# Patient Record
Sex: MALE | Race: WHITE | Employment: FULL TIME | URBAN - METROPOLITAN AREA
[De-identification: names, ages, dates, MRNs, and addresses within clinical notes are randomized per-mention and may not be internally consistent; named-entity substitution may affect disease eponyms.]

---

## 2018-09-04 ENCOUNTER — HOSPITAL ENCOUNTER (OUTPATIENT)
Age: 53
Discharge: HOME | End: 2018-09-04
Attending: EMERGENCY MEDICINE
Payer: COMMERCIAL

## 2018-09-04 VITALS
TEMPERATURE: 98 F | OXYGEN SATURATION: 96 % | BODY MASS INDEX: 25.05 KG/M2 | DIASTOLIC BLOOD PRESSURE: 77 MMHG | HEART RATE: 72 BPM | HEIGHT: 70 IN | WEIGHT: 175 LBS | RESPIRATION RATE: 18 BRPM | SYSTOLIC BLOOD PRESSURE: 134 MMHG

## 2018-09-04 DIAGNOSIS — I80.8 SUPERFICIAL THROMBOPHLEBITIS OF LEFT UPPER EXTREMITY: Primary | ICD-10-CM

## 2018-09-04 PROCEDURE — 99202 OFFICE O/P NEW SF 15 MIN: CPT

## 2018-09-04 PROCEDURE — 99203 OFFICE O/P NEW LOW 30 MIN: CPT

## 2018-09-04 RX ORDER — OMEPRAZOLE 20 MG/1
20 CAPSULE, DELAYED RELEASE ORAL EVERY OTHER DAY
COMMUNITY

## 2018-09-04 NOTE — ED PROVIDER NOTES
[facility]    History   Patient presents with:  Skin    Stated Complaint: POSS BUG BITE    HPI    The patient is a 27-year-old male with past history of hypertension, hyperlipidemia presents now with an a 98.2 °F (36.8 °C)  Temp src: Oral  SpO2: 96 %  O2 Device: n/a    Current:/77   Pulse 72   Temp 98.2 °F (36.8 °C) (Oral)   Resp 18   Ht 177.8 cm (5' 10\")   Wt 79.4 kg   SpO2 96%   BMI 25.11 kg/m²         Physical Exam    Constitutional: Yana Juan Js Medication List

## 2018-09-04 NOTE — ED INITIAL ASSESSMENT (HPI)
Bruising to left forearm on Saturday. Didn't think much of it. Was carrying things on Friday. Pea-like center with tenderness. No drainage. No fever. Possible insect bite?

## 2019-12-23 ENCOUNTER — WALK IN (OUTPATIENT)
Dept: URGENT CARE | Age: 54
End: 2019-12-23

## 2019-12-23 DIAGNOSIS — Z23 NEED FOR INFLUENZA VACCINATION: Primary | ICD-10-CM

## 2019-12-23 PROCEDURE — 90471 IMMUNIZATION ADMIN: CPT | Performed by: NURSE PRACTITIONER

## 2019-12-23 PROCEDURE — 90686 IIV4 VACC NO PRSV 0.5 ML IM: CPT | Performed by: NURSE PRACTITIONER

## 2020-11-17 PROBLEM — F10.21 ALCOHOLISM IN REMISSION (HCC): Status: ACTIVE | Noted: 2020-11-17

## 2021-10-26 ENCOUNTER — APPOINTMENT (OUTPATIENT)
Dept: GENERAL RADIOLOGY | Facility: HOSPITAL | Age: 56
DRG: 390 | End: 2021-10-26
Attending: EMERGENCY MEDICINE
Payer: COMMERCIAL

## 2021-10-26 ENCOUNTER — HOSPITAL ENCOUNTER (INPATIENT)
Facility: HOSPITAL | Age: 56
LOS: 3 days | Discharge: HOME | DRG: 390 | End: 2021-10-29
Attending: EMERGENCY MEDICINE | Admitting: HOSPITALIST
Payer: COMMERCIAL

## 2021-10-26 ENCOUNTER — APPOINTMENT (OUTPATIENT)
Dept: CT IMAGING | Facility: HOSPITAL | Age: 56
DRG: 390 | End: 2021-10-26
Attending: EMERGENCY MEDICINE
Payer: COMMERCIAL

## 2021-10-26 DIAGNOSIS — K56.609 SMALL BOWEL OBSTRUCTION (HCC): Primary | ICD-10-CM

## 2021-10-26 PROCEDURE — 80048 BASIC METABOLIC PNL TOTAL CA: CPT | Performed by: EMERGENCY MEDICINE

## 2021-10-26 PROCEDURE — 80076 HEPATIC FUNCTION PANEL: CPT | Performed by: EMERGENCY MEDICINE

## 2021-10-26 PROCEDURE — 87086 URINE CULTURE/COLONY COUNT: CPT | Performed by: EMERGENCY MEDICINE

## 2021-10-26 PROCEDURE — 96375 TX/PRO/DX INJ NEW DRUG ADDON: CPT

## 2021-10-26 PROCEDURE — 71045 X-RAY EXAM CHEST 1 VIEW: CPT | Performed by: EMERGENCY MEDICINE

## 2021-10-26 PROCEDURE — 96374 THER/PROPH/DIAG INJ IV PUSH: CPT

## 2021-10-26 PROCEDURE — 74177 CT ABD & PELVIS W/CONTRAST: CPT | Performed by: EMERGENCY MEDICINE

## 2021-10-26 PROCEDURE — 81001 URINALYSIS AUTO W/SCOPE: CPT | Performed by: EMERGENCY MEDICINE

## 2021-10-26 PROCEDURE — 99285 EMERGENCY DEPT VISIT HI MDM: CPT

## 2021-10-26 PROCEDURE — 85025 COMPLETE CBC W/AUTO DIFF WBC: CPT | Performed by: EMERGENCY MEDICINE

## 2021-10-26 PROCEDURE — 83735 ASSAY OF MAGNESIUM: CPT | Performed by: EMERGENCY MEDICINE

## 2021-10-26 RX ORDER — PROCHLORPERAZINE EDISYLATE 5 MG/ML
5 INJECTION INTRAMUSCULAR; INTRAVENOUS EVERY 8 HOURS PRN
Status: DISCONTINUED | OUTPATIENT
Start: 2021-10-26 | End: 2021-10-29

## 2021-10-26 RX ORDER — MORPHINE SULFATE 2 MG/ML
2 INJECTION, SOLUTION INTRAMUSCULAR; INTRAVENOUS EVERY 2 HOUR PRN
Status: DISCONTINUED | OUTPATIENT
Start: 2021-10-26 | End: 2021-10-29

## 2021-10-26 RX ORDER — SODIUM CHLORIDE 9 MG/ML
1000 INJECTION, SOLUTION INTRAVENOUS ONCE
Status: DISCONTINUED | OUTPATIENT
Start: 2021-10-26 | End: 2021-10-29

## 2021-10-26 RX ORDER — MORPHINE SULFATE 2 MG/ML
1 INJECTION, SOLUTION INTRAMUSCULAR; INTRAVENOUS EVERY 2 HOUR PRN
Status: DISCONTINUED | OUTPATIENT
Start: 2021-10-26 | End: 2021-10-29

## 2021-10-26 RX ORDER — DOCUSATE SODIUM 100 MG/1
100 CAPSULE, LIQUID FILLED ORAL 2 TIMES DAILY
COMMUNITY
End: 2021-12-20 | Stop reason: ALTCHOICE

## 2021-10-26 RX ORDER — ONDANSETRON 2 MG/ML
4 INJECTION INTRAMUSCULAR; INTRAVENOUS EVERY 6 HOURS PRN
Status: DISCONTINUED | OUTPATIENT
Start: 2021-10-26 | End: 2021-10-29

## 2021-10-26 RX ORDER — MIDAZOLAM HYDROCHLORIDE 10 MG/2ML
2 INJECTION, SOLUTION INTRAMUSCULAR; INTRAVENOUS ONCE
Status: COMPLETED | OUTPATIENT
Start: 2021-10-26 | End: 2021-10-26

## 2021-10-26 RX ORDER — ONDANSETRON 4 MG/1
4 TABLET, ORALLY DISINTEGRATING ORAL EVERY 8 HOURS PRN
COMMUNITY
End: 2021-12-20 | Stop reason: ALTCHOICE

## 2021-10-26 RX ORDER — CYCLOBENZAPRINE HCL 5 MG
5 TABLET ORAL 3 TIMES DAILY PRN
COMMUNITY
End: 2021-12-20 | Stop reason: ALTCHOICE

## 2021-10-26 RX ORDER — BISACODYL 10 MG
10 SUPPOSITORY, RECTAL RECTAL
Status: DISCONTINUED | OUTPATIENT
Start: 2021-10-26 | End: 2021-10-29

## 2021-10-26 RX ORDER — OXYCODONE HYDROCHLORIDE 5 MG/1
5 TABLET ORAL EVERY 4 HOURS PRN
COMMUNITY
End: 2021-12-20 | Stop reason: ALTCHOICE

## 2021-10-26 RX ORDER — SODIUM PHOSPHATE, DIBASIC AND SODIUM PHOSPHATE, MONOBASIC 7; 19 G/133ML; G/133ML
1 ENEMA RECTAL ONCE AS NEEDED
Status: DISCONTINUED | OUTPATIENT
Start: 2021-10-26 | End: 2021-10-29

## 2021-10-26 RX ORDER — MORPHINE SULFATE 4 MG/ML
4 INJECTION, SOLUTION INTRAMUSCULAR; INTRAVENOUS EVERY 2 HOUR PRN
Status: DISCONTINUED | OUTPATIENT
Start: 2021-10-26 | End: 2021-10-29

## 2021-10-26 RX ORDER — MORPHINE SULFATE 4 MG/ML
4 INJECTION, SOLUTION INTRAMUSCULAR; INTRAVENOUS ONCE
Status: COMPLETED | OUTPATIENT
Start: 2021-10-26 | End: 2021-10-26

## 2021-10-26 RX ORDER — ONDANSETRON 2 MG/ML
4 INJECTION INTRAMUSCULAR; INTRAVENOUS ONCE
Status: COMPLETED | OUTPATIENT
Start: 2021-10-26 | End: 2021-10-26

## 2021-10-26 RX ORDER — MORPHINE SULFATE 15 MG/1
15 TABLET ORAL EVERY 4 HOURS PRN
COMMUNITY
End: 2021-12-20 | Stop reason: ALTCHOICE

## 2021-10-26 RX ORDER — SODIUM CHLORIDE 9 MG/ML
INJECTION, SOLUTION INTRAVENOUS CONTINUOUS
Status: DISCONTINUED | OUTPATIENT
Start: 2021-10-26 | End: 2021-10-29

## 2021-10-26 NOTE — ED INITIAL ASSESSMENT (HPI)
Pt reports having lumbar fusion surgery last Thursday now with abdominal pain/ distension, constipation and nausea

## 2021-10-26 NOTE — ED PROVIDER NOTES
Patient Seen in: [facility] AND [facility] Emergency Department      History   Patient presents with:  Postop/Procedure Problem    Stated Complaint: post op complication, lumbar fusion, abdominal pain , unable to eat    Subjective:   HPI    50-year-old male pres kg   SpO2 95%   BMI 26.54 kg/m²         Physical Exam  Vitals and nursing note reviewed. Constitutional:       Appearance: He is well-developed. HENT:      Head: Normocephalic and atraumatic.    Eyes:      General: Lids are normal.      Extraocular Move Albumin 3.1 (*)     All other components within normal limits   CBC W/ DIFFERENTIAL - Abnormal; Notable for the following components:    Lymphocyte Absolute 0.67 (*)     Monocyte Absolute 1.21 (*)     All other components within normal limits   MAGNESIUM - stomach. Moderate air-filled distention of proximal visualized small bowel. Correlate clinically for distal bowel obstruction.     Dictated by (CST): Don Block MD on 10/26/2021 at 7:13 PM     Finalized by (CST): Don Block MD on 10/26/2021 at 7:1 distention of proximal and mid small bowel with more decompressed distal small bowel. Findings suggest a more distal small bowel obstruction. Appendix is mildly fluid-filled but probably normal in caliber.   Of note, there is moderate fluid-filled distent postoperative changes from previous hernia repair. 4.   Recent postoperative changes at L4-L5 from decompression laminectomy and posterior fusion.      Dictated by (CST): Raul Means MD on 10/26/2021 at 8:51 PM     Finalized by (CST): Raul Means MD

## 2021-10-27 PROCEDURE — C9113 INJ PANTOPRAZOLE SODIUM, VIA: HCPCS | Performed by: INTERNAL MEDICINE

## 2021-10-27 PROCEDURE — 85025 COMPLETE CBC W/AUTO DIFF WBC: CPT | Performed by: HOSPITALIST

## 2021-10-27 PROCEDURE — 83735 ASSAY OF MAGNESIUM: CPT | Performed by: HOSPITALIST

## 2021-10-27 PROCEDURE — 87493 C DIFF AMPLIFIED PROBE: CPT | Performed by: HOSPITALIST

## 2021-10-27 PROCEDURE — 80048 BASIC METABOLIC PNL TOTAL CA: CPT | Performed by: HOSPITALIST

## 2021-10-27 RX ORDER — POTASSIUM CHLORIDE 14.9 MG/ML
20 INJECTION INTRAVENOUS ONCE
Status: COMPLETED | OUTPATIENT
Start: 2021-10-27 | End: 2021-10-27

## 2021-10-27 NOTE — PROGRESS NOTES
No C/O of CP SOB NV. Pain controlled. Minimal low back. Complete resolution of right LE symptoms. Bowel movement x2. Distention improved.    AVSS  Dressing is CDI  Motor to bilateral LE at baseline 5/5 throughout  Nv intact  Homans neg    S/p L4-5 TLIF 10/2

## 2021-10-27 NOTE — H&P
[facility] Hospitalist H&P       CC: Patient presents with:  Postop/Procedure Problem       PCP: Marie Dominguez MD    History of Present Illness: Patient is a 48year old male with PMH sig for HTN, HL, GERD/ PUD, Anxiety/Depression, Spinal Stenosis who release  tabs  take one tablet by mouth every 4hrs or 2 tablets by mouth every 6 hrs as needed for pain, Disp: , Rfl:   docusate sodium 100 MG Oral Cap, Take 100 mg by mouth 2 (two) times daily. , Disp: , Rfl:   SERTRALINE 50 MG Oral Tab, TAKE 1 TABLET(50 M CBC/Chem  Recent Labs   Lab 10/26/21  1815 10/27/21  0535   WBC 7.3 5.4   HGB 13.4 10.8*   MCV 90.5 91.2   .0 303.0       Recent Labs   Lab 10/26/21  1815 10/27/21  0535   * 136   K 3.5 3.1*   CL 92* 99   CO2 31.0 31.0   BUN 11 11   CREATSER postoperative changes from previous hernia repair. 4.   Recent postoperative changes at L4-L5 from decompression laminectomy and posterior fusion.      Dictated by (CST): Jaleesa Devi MD on 10/26/2021 at 8:51 PM     Finalized by (CST): Jaleesa Devi MD

## 2021-10-27 NOTE — PAYOR COMM NOTE
--------------  ADMISSION REVIEW     Payor: Miguelito Lyme [employer] PPO  Subscriber #:  [id number]  Authorization Number: [id number]    Admit date: 10/26/21  Admit time: 10:17 PM       REVIEW DOCUMENTATION:     ED Provider Notes      ED Provider Notes signed b for stated complaint: post op complication, lumbar fusion, abdominal pain , unable to eat  Other systems are as noted in HPI. Constitutional and vital signs reviewed. All other systems reviewed and negative except as noted above.     Physical Exam Ketones Urine 20  (*)     Blood Urine Moderate (*)     Protein Urine 30  (*)     Leukocyte Esterase Urine Trace (*)     WBC Urine 11-20 (*)     RBC Urine 3-5 (*)     All other components within normal limits   BASIC METABOLIC PANEL (8) - Abnormal; Notab COMPARISON: None. INDICATIONS: Verify correct placement of NGT. TECHNIQUE:   Single view. FINDINGS:  CARDIAC/VASC: Normal.  No cardiac silhouette abnormality or cardiomegaly. Unremarkable pulmonary vasculature.   MEDIAST/NICOLA:   No visible mass or felipe focal lesion. Small amount of perisplenic fluid noted. STOMACH: No gross gastric mass, obstruction or focal abnormality. Duodenum unremarkable. NG tube noted in the stomach. PANCREAS: No lesion, fluid collection, ductal dilatation, or atrophy.   Ivan Pod postoperative ileus since the patient is postop 5 days ago. Follow-up studies are advised. 2. Diffuse fatty infiltration of the liver with a prominent right lobe.   1.1 cm poorly defined low-attenuation lesion in the right lobe of the liver is indeterminat ICD-10-CM Noted POA    * (Principal) Small bowel obstruction [facility]) K56.609 10/26/2021 Unknown                         Signed by Rui Way MD on 10/26/2021  9:32 PM         H&P Note    No notes of this type exist for this encounter. 140/82 92 % — — — [place]    10/26/21 1830 — 77 — 130/75 96 % — — — [place]    10/26/21 1815 — 79 16 140/86 94 % — — — [place]    10/26/21 1616 98.7 °F (37.1 °C) 84 18 142/85 99 % 185 lb None (Room air) — AL        NAISMWA Scores (since admission)     None          PLEASE F

## 2021-10-27 NOTE — ED QUICK NOTES
Orders for admission, patient is aware of plan and ready to go upstairs. Any questions, please call ED STEPHANIE Zavala  at extension [phone]. Type of COVID test sent: Rapid negative  COVID Suspicion level: Low    Titratable drug(s) infusing:  NaCl   Rate: 125    L

## 2021-10-27 NOTE — PAYOR COMM NOTE
--------------  CONTINUED STAY REVIEW    Payor: Yen Tavarez [employer] PPO  Subscriber #:  [id number]  Authorization Number: [id number]    Admit date: 10/26/21  Admit time: 10:17 PM    Admitting Physician: Abimael Talbert MD  Attending Physician:  Ernestine Morin 121/68 93 % — — — [place]    10/26/21 1900 — 90 — 131/78 95 % — — — [place]    10/26/21 1845 — 96 16 140/82 92 % — — — [place]    10/26/21 1830 — 77 — 130/75 96 % — — — [place]    10/26/21 1815 — 79 16 140/86 94 % — — — [place]    10/26/21 1616 98.7 °F (37.1 °C) 84 18 142/85 99 %

## 2021-10-27 NOTE — PLAN OF CARE
Patient alert and oriented x4. Up independently. Walking in halls through out shift. Brace in place to back. NPO. NG tube in place to LIS. Clamping trial started this afternoon. Voiding freely. (+) for BM this AM. Pain controlled with PRN morphine.  Call l effectiveness of ordered antiemetic medications  - Provide nonpharmacologic comfort measures as appropriate  - Advance diet as tolerated, if ordered  - Obtain nutritional consult as needed  - Evaluate fluid balance  Outcome: Not Progressing  Goal: Maintain

## 2021-10-27 NOTE — PLAN OF CARE
Pt came to ED for abdominal pain, difficulty urinating, constipation, and N/V. CT of the abdomen showed small bowel obstruction. Pt is on NG to LIS in the right nare. Pt is strict NPO. Pt had a lumbar infusion on 10/21/2021. No complaints of N/V overnight. nausea and vomiting  Description: INTERVENTIONS:  - Maintain adequate hydration with IV or PO as ordered and tolerated  - Nasogastric tube to low intermittent suction as ordered  - Evaluate effectiveness of ordered antiemetic medications  - Provide nonphar pain  - Anticipate increased pain with activity and pre-medicate as appropriate  Outcome: Progressing

## 2021-10-27 NOTE — CM/SW NOTE
SW received phone call from Gianluca Berry to notify SW that patient is current with RN and PT. SW to send updated orders and notes. PLAN: Home with [facility]-orders/f2f placed.      ELVIRA Zarco, [facility]    [id number]

## 2021-10-27 NOTE — CONSULTS
SURGICAL CONSULT      CC: Patient presents with:  Postop/Procedure Problem       PCP: Vinay Camara MD    History of Present Illness: Patient is a 48year old male with PMH sig for HTN, HL, GERD/ PUD, Anxiety/Depression, Spinal Stenosis who prese Nausea (q 6 hrs). , Disp: , Rfl:   morphINE 15 MG Oral Tab, Take 15 mg by mouth every 4 (four) hours as needed for Pain.  Morphine sulfate 15 mg ER by mouth twice a day  for 5 days then daily, Disp: , Rfl:   oxyCODONE 5 MG Oral Tab, Take 5 mg by mouth every DATA:      CBC/Chem  Recent Labs   Lab 10/26/21  1815 10/27/21  0535   WBC 7.3 5.4   HGB 13.4 10.8*   MCV 90.5 91.2   .0 303.0       Recent Labs   Lab 10/26/21  1815 10/27/21  0535   * 136   K 3.5 3.1*   CL 92* 99   CO2 31.0 31.0   BUN 11 11 information is transmitted to the ACR ([facility]) NRDR ([address]) which includes the Dose Index Registry. FINDINGS:  LIVER: Mild diffuse fatty infiltration of the liver.   Enlarged right lobe measuring 20 cm in l changes in the paraspinous musculature. LUNG BASES: Mild linear bibasilar atelectasis. OTHER: Negative. CONCLUSION:  1. There is moderate fluid in the air-filled distention of proximal and mid small bowel with more decompressed distal small bowel. he was going without as he couldn't keep it down. He has a small BM on Sunday and has had a few BMs since admission. He has a hernia repair in 2014 (laparoscopic ventral hernia repair with Dr. Kimberly Mijares). Denies CP, SOB, f/c.  Does have a headache but states

## 2021-10-28 ENCOUNTER — APPOINTMENT (OUTPATIENT)
Dept: ULTRASOUND IMAGING | Facility: HOSPITAL | Age: 56
DRG: 390 | End: 2021-10-28
Attending: INTERNAL MEDICINE
Payer: COMMERCIAL

## 2021-10-28 PROCEDURE — 82962 GLUCOSE BLOOD TEST: CPT

## 2021-10-28 PROCEDURE — 76705 ECHO EXAM OF ABDOMEN: CPT | Performed by: INTERNAL MEDICINE

## 2021-10-28 PROCEDURE — 85025 COMPLETE CBC W/AUTO DIFF WBC: CPT | Performed by: INTERNAL MEDICINE

## 2021-10-28 PROCEDURE — 83735 ASSAY OF MAGNESIUM: CPT | Performed by: INTERNAL MEDICINE

## 2021-10-28 PROCEDURE — C9113 INJ PANTOPRAZOLE SODIUM, VIA: HCPCS | Performed by: INTERNAL MEDICINE

## 2021-10-28 PROCEDURE — 80048 BASIC METABOLIC PNL TOTAL CA: CPT | Performed by: INTERNAL MEDICINE

## 2021-10-28 RX ORDER — POTASSIUM CHLORIDE 14.9 MG/ML
20 INJECTION INTRAVENOUS ONCE
Status: COMPLETED | OUTPATIENT
Start: 2021-10-28 | End: 2021-10-28

## 2021-10-28 NOTE — PROGRESS NOTES
No C/O of CP SOB NV. Pain controlled with morphine push every 8 hours. Very hungry with no nausea.  Passing gas and BM.   AVSS  incision is CDI mild edema inferior to the incision  Motor to bilateral LE at baseline 5/5 throughout  Nv intact  Homans neg    S

## 2021-10-28 NOTE — PROGRESS NOTES
[facility] Hospitalist Progress Note     CC: Hospital Follow up    PCP: Ricardo Cruz MD       Assessment/Plan:     Principal Problem:    Small bowel obstruction [facility])  Patient is a 48year old male with PMH sig for HTN, HL, GERD/ PUD, Anxiety/Depressio (Last 24 hours) at 10/28/2021 0947  Last data filed at 10/28/2021 2502  Gross per 24 hour   Intake 2329 ml   Output 355 ml   Net 1974 ml       Last 3 Weights  10/26/21 1616 : 185 lb (83.9 kg)  09/02/21 1251 : 185 lb (83.9 kg)  07/02/21 1130 : 196 lb 3.2 oz distention of proximal and mid small bowel with more decompressed distal small bowel. Findings are suggestive of a more distal small bowel obstruction. This is less likely ileus given the fact that the more distal small bowel is not involved.   Recommend

## 2021-10-28 NOTE — CM/SW NOTE
SW met with patient and his partner at bedside, introduced self and role. Patient lives with his partner in home (not address on face sheet). Lives in [place]. Patient is current with Gianluca Berry.  Patient expressed frustration about hospital

## 2021-10-28 NOTE — PAYOR COMM NOTE
--------------  10/27- 28 CONTINUED STAY REVIEW    Payor: Miranda Friend [employer] PPO  Subscriber #:  [id number]  Authorization Number: [id number]    10/27:    SURGERY:  History of Present Illness: Patient is [age] year old male with PMH sig for HTN, HL, GERD/ ileus  Afebrile  WBC WNL     PLAN:     SBO vs Ileus  NGT to LIS - will do clamping trial, d/w RN  Continue NPO, if NG tube is removed will reassess in Am for diet  Continue IVF  Ambulate  Suspect possible Ileus with hx of recent surgery     10/28:    Massiel Miner (36.9 °C), temperature source Oral, resp.  rate 18, height 5' 10\" (1.778 m), weight 185 lb (83.9 kg), SpO2 95 %.     Temp:  [98.4 °F (36.9 °C)-98.9 °F (37.2 °C)] 98.4 °F (36.9 °C)  Pulse:  [71-86] 71  Resp:  [18] 18  BP: (118-128)/(61-77) 128/77       Rece Jami Gamez RN          Vitals (last day)     Date/Time Temp Pulse Resp BP SpO2 Weight O2 Device O2 Flow Rate (L/min) Who    10/28/21 0646 98.4 °F (36.9 °C) 71 18 128/77 95 % — None (Room air) — EN    10/27/21 2131 98.7 °F (37.1 °C) 75 18 118/72 98

## 2021-10-28 NOTE — PROGRESS NOTES
[facility]    Progress Note    Yuri Vargas Patient Status:  Inpatient    1965 MRN [id number]   Location [facility] 4W/SW/SE Attending Celeste Rodriguez MD   Hosp Day # 2 PCP Cabrera Chiang MD        Subjective:   D 122/67, pulse 67, temperature 98.8 °F (37.1 °C), temperature source Oral, resp. rate 18, height 5' 10\" (1.778 m), weight 185 lb (83.9 kg), SpO2 96 %. I/O last 3 completed shifts:   In: 3155.2 [I.V.:3055.2; IV PIGGYBACK:100]  Out: 948 [Urine:640; Emesis body of the stomach. Moderate air-filled distention of proximal visualized small bowel. Correlate clinically for distal bowel obstruction.     Dictated by (CST): Sanjeev Jewell MD on 10/26/2021 at 7:13 PM     Finalized by (CST): Sanjeev Jewell MD on 10/26

## 2021-10-28 NOTE — PLAN OF CARE
Patient alert and oriented x4. Up independently. Walking in halls through out shift. Brace in place to back. Started on clear liquid diet, tolerating well. Voiding freely. (+) for BM. Pain controlled with PRN morphine. Call light in reach.  Frequent roundi antiemetic medications  - Provide nonpharmacologic comfort measures as appropriate  - Advance diet as tolerated, if ordered  - Obtain nutritional consult as needed  - Evaluate fluid balance  Outcome: Not Progressing  Goal: Maintains or returns to baseline

## 2021-10-28 NOTE — PLAN OF CARE
NG removed per orders after residual checked. No nausea. BM x 1 overnight. Up walking hallways independently. Back brace in place at all times. Morphine prn for pain. Voiding WNL. Safety plan in place. Plan to advance diet per surgery.  Home with girlfriend tolerated  - Evaluate effectiveness of GI medications  - Encourage mobilization and activity  - Obtain nutritional consult as needed  - Establish a toileting routine/schedule  - Consider collaborating with pharmacy to review patient's medication profile  O

## 2021-10-29 VITALS
RESPIRATION RATE: 18 BRPM | BODY MASS INDEX: 26.48 KG/M2 | DIASTOLIC BLOOD PRESSURE: 65 MMHG | HEIGHT: 70 IN | SYSTOLIC BLOOD PRESSURE: 109 MMHG | TEMPERATURE: 99 F | HEART RATE: 62 BPM | OXYGEN SATURATION: 96 % | WEIGHT: 185 LBS

## 2021-10-29 PROCEDURE — 80048 BASIC METABOLIC PNL TOTAL CA: CPT | Performed by: INTERNAL MEDICINE

## 2021-10-29 PROCEDURE — C9113 INJ PANTOPRAZOLE SODIUM, VIA: HCPCS | Performed by: INTERNAL MEDICINE

## 2021-10-29 PROCEDURE — 85025 COMPLETE CBC W/AUTO DIFF WBC: CPT | Performed by: INTERNAL MEDICINE

## 2021-10-29 PROCEDURE — 83735 ASSAY OF MAGNESIUM: CPT | Performed by: INTERNAL MEDICINE

## 2021-10-29 NOTE — PROGRESS NOTES
No C/O of CP SOB NV. Pain controlled with morphine.  Passing gas and had multiple BM yesterday   AVSS  Motor to bilateral LE at baseline 5/5 throughout  Nv intact  Homans neg    S/p L4-5 TLIF    Mobilize  PT/OT  NO lifting over 15 pounds or ROM of the lumba

## 2021-10-29 NOTE — CM/SW NOTE
DC Order present    SW notified [address], orders/f2f placed.      ELVIRA Yates, [facility]    [id number]

## 2021-10-29 NOTE — DISCHARGE SUMMARY
General Medicine Discharge Summary     Patient ID:  Meliza Morley  48year old  11/1/1965    Admit date: 10/26/2021    Discharge date and time: No discharge date for patient encounter.  10/29/21    Attending Physician: Og Bautista MD     Consults: MIA in 3 months, f/u LFTs as outpatient      GERD/ PUD  - PPI     Chronic Problems  HTN, HL, Anxiety/Depression  - resume home meds      Spinal Stenosis  S/p L4-5 TLIF 10/21/21  - pain control  - ortho on consult, f/u in 2 weeks     Operative Procedures: 10/26/2021 at 8:51 PM     Finalized by (CST): Hannah Ervin MD on 10/26/2021 at 9:03 PM          Disposition: home    Activity: as tolerated   Diet: general   Wound Care: no needs  Code Status: Full Code  O2: no needs    Home Medication Changes: as below

## 2021-10-29 NOTE — PLAN OF CARE
Problem: Patient Centered Care  Goal: Patient preferences are identified and integrated in the patient's plan of care  Description: Interventions:  - What would you like us to know as we care for you?  Home with partner  - Provide timely, complete, and ac INTERVENTIONS:  - Assess bowel function  - Maintain adequate hydration with IV or PO as ordered and tolerated  - Evaluate effectiveness of GI medications  - Encourage mobilization and activity  - Obtain nutritional consult as needed  - Establish a toiletin

## 2021-10-29 NOTE — PAYOR COMM NOTE
--------------  10/29 CONTINUED STAY REVIEW    Payor: [employer] PPO  Subscriber #:  [id number]  Authorization Number: [id number]          Dc home

## 2021-10-29 NOTE — PLAN OF CARE
Pt is tolerating soft, low fiber diet. Pt is passing gas and had a BM overnight. Pt ambulating in room and in hallway independently. Pt is okay to discharge. PIV was removed. Discharge papers printed and education provided to pt. Follow-up appts discussed. effectiveness of ordered antiemetic medications  - Provide nonpharmacologic comfort measures as appropriate  - Advance diet as tolerated, if ordered  - Obtain nutritional consult as needed  - Evaluate fluid balance  Outcome: Completed  Goal: Maintains or r

## 2021-11-01 NOTE — PAYOR COMM NOTE
--------------  DISCHARGE REVIEW    Payor: Anna Browning [employer] PPO  Subscriber #:  [id number]  Authorization Number: [id number]    Admit date: 10/26/21  Admit time:  10:17 PM  Discharge Date: 10/29/2021  2:41 PM     Admitting Physician: Arabella Baker MD Has an appetite at the moment. Girlfriend at bedside.      Hospital Course:   Patient is a 51 year old male with PMH sig for HTN, HL, GERD/ PUD, Anxiety/Depression, Spinal Stenosis who presents with abdominal pain s/p recent spinal surgery 5 days ago.     days ago. Follow-up studies are advised. 2. Diffuse fatty infiltration of the liver with a prominent right lobe.   1.1 cm poorly defined low-attenuation lesion in the right lobe of the liver is indeterminate and is not typical for a cyst.  Recommend ultras [address] [facility] instructions:       Other Discharge Instructions:       Liver Lesion  - CT with 1.1 cm poorly defined low-attenuation lesion in the right lobe of the liver is indeterminate and is not typical

## 2022-02-05 ENCOUNTER — LAB ENCOUNTER (OUTPATIENT)
Dept: LAB | Facility: HOSPITAL | Age: 57
End: 2022-02-05
Attending: INTERNAL MEDICINE
Payer: COMMERCIAL

## 2022-02-05 DIAGNOSIS — Z12.11 COLON CANCER SCREENING: ICD-10-CM

## 2022-02-06 LAB — SARS-COV-2 RNA RESP QL NAA+PROBE: NOT DETECTED

## 2022-02-08 ENCOUNTER — ANESTHESIA EVENT (OUTPATIENT)
Dept: ENDOSCOPY | Facility: HOSPITAL | Age: 57
End: 2022-02-08
Payer: COMMERCIAL

## 2022-02-08 ENCOUNTER — HOSPITAL ENCOUNTER (OUTPATIENT)
Facility: HOSPITAL | Age: 57
Setting detail: HOSPITAL OUTPATIENT SURGERY
Discharge: HOME | End: 2022-02-08
Attending: INTERNAL MEDICINE | Admitting: INTERNAL MEDICINE
Payer: COMMERCIAL

## 2022-02-08 ENCOUNTER — ANESTHESIA (OUTPATIENT)
Dept: ENDOSCOPY | Facility: HOSPITAL | Age: 57
End: 2022-02-08
Payer: COMMERCIAL

## 2022-02-08 VITALS
WEIGHT: 190 LBS | BODY MASS INDEX: 28.79 KG/M2 | SYSTOLIC BLOOD PRESSURE: 108 MMHG | OXYGEN SATURATION: 96 % | RESPIRATION RATE: 16 BRPM | HEART RATE: 79 BPM | DIASTOLIC BLOOD PRESSURE: 75 MMHG | HEIGHT: 68 IN

## 2022-02-08 DIAGNOSIS — Z12.11 COLON CANCER SCREENING: Primary | ICD-10-CM

## 2022-02-08 DIAGNOSIS — Z12.11 SPECIAL SCREENING FOR MALIGNANT NEOPLASMS, COLON: ICD-10-CM

## 2022-02-08 PROCEDURE — 0DJD8ZZ INSPECTION OF LOWER INTESTINAL TRACT, VIA NATURAL OR ARTIFICIAL OPENING ENDOSCOPIC: ICD-10-PCS | Performed by: INTERNAL MEDICINE

## 2022-02-08 RX ORDER — NALOXONE HYDROCHLORIDE 0.4 MG/ML
80 INJECTION, SOLUTION INTRAMUSCULAR; INTRAVENOUS; SUBCUTANEOUS AS NEEDED
Status: DISCONTINUED | OUTPATIENT
Start: 2022-02-08 | End: 2022-02-08

## 2022-02-08 RX ORDER — SODIUM CHLORIDE, SODIUM LACTATE, POTASSIUM CHLORIDE, CALCIUM CHLORIDE 600; 310; 30; 20 MG/100ML; MG/100ML; MG/100ML; MG/100ML
INJECTION, SOLUTION INTRAVENOUS CONTINUOUS
Status: DISCONTINUED | OUTPATIENT
Start: 2022-02-08 | End: 2022-02-08

## 2022-02-08 RX ADMIN — SODIUM CHLORIDE, SODIUM LACTATE, POTASSIUM CHLORIDE, CALCIUM CHLORIDE: 600; 310; 30; 20 INJECTION, SOLUTION INTRAVENOUS at 14:31:00

## 2022-02-08 NOTE — OPERATIVE REPORT
[facility] Patient Status:  Hospital Outpatient Surgery    1965 MRN [id number]   Location [address] [address] Attending Mary Ann Guadarrama MD   Hosp Day # 0 PCP Attila Olson MD         PATIENT NAME: Emilie Neil OF OPERATION: 2022    PREOPERATIVE DIAGNOSIS:  1. CRC screening  POSTOPERATIVE DIAGNOSIS:  1. Diverticulosis    PROCEDURE PERFORMED: Colonoscopy with MAC sedation  SURGEON: Elis Lindsay MD   MEDICATIONS: MAC IV in divided doses under the supervision of Anesthesia  PROCEDURE AND FINDINGS: The patient was placed into the left lateral decubitus position after informed consent was obtained. All questions were answered. MAC IV sedation was administered. A rectal exam was performed which was normal.  The Olympus video colonoscope was then introduced through the rectum and advanced through the colon to the cecum. The quality of prep was excellent, adequate, Aronchick 1. The cecum was identified by the ileocecal valve and appendiceal orifice. The colonoscope was then withdrawn and the mucosa was further carefully inspected. There were diverticula noted in the entire colon. The remainder of the entire examined colon was otherwise normal.  After retroflexion in the rectum, the colonoscope was straightened and removed and the procedure was completed. The patient tolerated the procedure well. There were no implants placed nor significant blood loss. There were no immediate apparent complications. Anesthesia was present to assist in monitoring the patient during the entire length of the moderate sedation time. RECOMMENDATIONS   Consume a high fiber diet. Repeat colonoscopy in 10 years    Elis Lindsay MD    Copy Dr Mary Ji

## 2022-02-08 NOTE — ANESTHESIA POSTPROCEDURE EVALUATION
[address] [facility] Patient Status:  Hospital Outpatient Surgery   Age/Gender 55year old male MRN [id number]   Location [address] [address] Attending Sekou Lopez MD   Hosp Day # 0 PCP Gilbert Dobbs MD       Anesthesia Post-op Note    COLONOSCOPY    Procedure Summary     Date: 02/08/22 Room / Location: [facility] ENDOSCOPY 03 / [facility] ENDOSCOPY    Anesthesia Start: 3121 Anesthesia Stop: 2818    Procedure: COLONOSCOPY (N/A ) Diagnosis:       Colon cancer screening      Special screening for malignant neoplasms, colon      (Diverticulosis)    Surgeons: Sekou Lopez MD Anesthesiologist: Crispin Hernandez MD    Anesthesia Type: MAC ASA Status: 3          Anesthesia Type: MAC    Vitals Value Taken Time   BP 98/69 02/08/22 1431   Temp  02/08/22 1432   Pulse 62 02/08/22 1431   Resp 16 02/08/22 1431   SpO2 98 % 02/08/22 1431       Patient Location: Endoscopy    Anesthesia Type: MAC    Airway Patency: patent    Postop Pain Control: adequate    Mental Status: mildly sedated but able to meaningfully participate in the post-anesthesia evaluation    Nausea/Vomiting: none    Cardiopulmonary/Hydration status: stable euvolemic    Complications: no apparent anesthesia related complications    Postop vital signs: stable    Dental Exam: Unchanged from Preop    Patient to be discharged home when criteria met.

## (undated) DEVICE — 1200CC GUARDIAN II: Brand: GUARDIAN

## (undated) DEVICE — FILTERLINE NASAL ADULT O2/CO2

## (undated) DEVICE — 3M™ RED DOT™ MONITORING ELECTRODE WITH FOAM TAPE AND STICKY GEL, 50/BAG, 20/CASE, 72/PLT 2570: Brand: RED DOT™

## (undated) DEVICE — ENDOSCOPY PACK - LOWER: Brand: MEDLINE INDUSTRIES, INC.

## (undated) DEVICE — Device: Brand: DEFENDO AIR/WATER/SUCTION AND BIOPSY VALVE